# Patient Record
Sex: FEMALE | Race: WHITE
[De-identification: names, ages, dates, MRNs, and addresses within clinical notes are randomized per-mention and may not be internally consistent; named-entity substitution may affect disease eponyms.]

---

## 2017-05-12 NOTE — HISTORY & PHYSICAL EXAMINATION
DATE OF ADMISSION:  05/16/2017

 

PREOPERATIVE HISTORY AND PHYSICAL

 

HISTORY OF PRESENT ILLNESS:  A 56-year-old female presents for initial

evaluation of pain at the recommendation of Dr. Chase's, located in the

right heel, condition has existed for several months, described as burning,

sharp, stabbing, began several months ago.  She denies any precipitating

event or a recent exposure history for this condition.  This condition is

graded as a 6 on a 10 point scale, gradually worsened over time.  Associated

signs and symptoms include swelling over the area.  She notes Cam walker

improve the condition.  Oral medication did not help that much, physical

therapy increased her discomfort, rest somewhat improved her condition.  The

patient notes in December is when she notes the more severe pain had started

and underwent 7 weeks of therapy and then stopped as it being made worse. 

She then wore the Cam walker for 2 months, most of the time and she is

currently on Percocet for back discomfort which barely touches her foot pain.

 At nighttime, she notes she is unable to lay flat on her bed or move a

certain way, she is uncomfortable.  Dr. Chase in [place] has been

treating her conservatively for the past several months and recommended

surgical intervention, referred her to our office, due to failure of

conservative care.  She has been diabetic for over a year, has recently lost

some weight and she was able to stop her insulin that she had previously been

on.

 

PAST SURGICAL HISTORY:  Breast surgery, neck surgery, heart surgery, back

surgery, hysterectomy, and gallbladder removal.

2

PAST MEDICAL HISTORY:  Gastrointestinal problems, COPD, cardiac disease,

stroke, anxiety disorder, depression, arthritis and back problems.

 

MEDICATIONS:  Sominex, trazodone, Effexor, Percocet, gabapentin, and

verapamil.

 

ALLERGIES:  NONSTEROIDALS, CIPRO, AND PENICILLINS.

 

FAMILY HISTORY:  Anxiety, stress disorder, depression, back problems, birth

defects, cardiovascular disease, COPD, diabetes, gastrointestinal problems,

high cholesterol, hypertension, stroke, cancer, thyroid disease.

 

SOCIAL HISTORY:  The patient denies smoking, alcohol use, illicit drug use,

and STDs.

 

REVIEW OF SYSTEMS:  Unremarkable except chief complaint.

 

PHYSICAL EXAMINATION:

VITAL SIGNS:  Height 5 feet 7 inches, weight 151 pounds.  Body mass index 24.

CONSTITUTIONAL:  The patient appears well-developed and nourished with good

attention to body grooming and habitus.

LOWER EXTREMITY VASCULAR:  DP palpable.  PT palpable.  PT was 2/4

bilaterally, DP is 1/4 bilaterally.  Popliteal was 1/4 bilaterally.  Digital

hair is present.

DERMATOLOGIC:  Right Achilles tendon shows redness.  Inspection and palpation

of bursa shows soft, skin colored, soft elevated painful mass over the

Achilles tendon.

NEUROLOGICAL:  Touch, pin, vibratory pain, proprioception sensations are

normal.  Epicritic sensation per [place] monofilament 5.07 is

intact.

MUSCULOSKELETAL:  Muscle tone is normal.  Muscle strength is 5/5 all groups

tested.  Examination of posterior aspect of the heel reveals decreased ankle

joint, dorsiflexion, knee extended on the right, severe pain directly over

the Achilles attachment site, bony prominence posterior superior aspect of

the calcaneus and 2 focal areas of tenderness.  The heel shows an enlarged

posterior superior aspect of the calcaneus with pain to palpation directly

over the insertional area of the Achilles tendon.  Decreased joint range of

motion with knee flexed and extended.

 

IMPRESSION:

1.  Jovana deformity.

2.  Achilles tendinitis, left.

3.  Equinus, right.

4.  Achilles bursitis, right.

5.  Difficulty walking, right lower extremity pain, leg and foot, right.

 

PLAN:  I explained the mechanical etiology of discomfort.  Treatment

consisted of rest, ice, analgesics, nonsteroidal anti-inflammatory drugs,

night splints, physical therapy, steroid injections, stretching exercises,

orthotics, immobilization.  I explained the likely mechanical etiology of the

Jovana deformity is often called a pump bump, that can occur in 1 or both

feet, most commonly caused by the shape of the calcaneus, biomechanics,

hereditary and high arches and tight Achilles tendon, the prominent bump on

the back of the heel is a bony enlargement that often leads to painful

bursitis between the tendon and the bone due to the shoe irritation.  In the

cases of high arches, the calcaneus is tilted toward the Achilles tendon

causing the upper most superior portion of the calcaneus to rub against the

tendon.  Achilles tendonitis is often associated with the deformity due to

local irritation and thickening the tissues.  Treatment consists of rest,

ice, analgesics, nonsteroidals, anti-inflammatory drugs, heel lifts, heel

pad, shoe modification, night splints, physical therapy, steroid injections,

stretching exercises, orthotics, immobilization, avoid walking uphill.  The 
patient

was informed that nonsurgical treatment is targeted to reduce inflammation of

the soft tissue.  These conservative treatments will resolve the bursitis,

but will not likely shrink the bony projection.  Treatment usually begins

with nonsurgical measures and surgery is considerable in all other measures

have failed and pain is intolerable.

 

Surgical procedures to be performed:

1.  Jovana resection.

2.  Removal of exostosis posterior calcaneus with possible reattachment of

Achilles tendon.

3.  Achilles tendon lengthening.

4.  Achilles tendon lengthening, right lower extremity.

This will be performed under general anesthesia as an outpatient at the

hospital in the prone position.  Procedure, risks and complications were

fully reviewed with the patient.  Consent form and foot diagram and

illustration reviewed in all their entirety.  All the patient's questions

were answered.  Complications were discussed in detail with the patient

including pain, infection, swelling that may or may not be excessive, pins

and needles feeling, numbness, metatarsalgia, excessive bleeding, delay or

nonhealing of bone, delay or nonhealing of skin, enlarged scar, failure of

the procedure, reoccurrence or worsening of condition which may or may not

require further surgery, adverse reaction to anesthesia, suture

or other implant material, loss of toe, foot, or leg, flail toe, stiff toe,

short toe, elevated toe, transfer lesion or callus, peripheral neuritis.

Complications such as phlebitis, damage to nerves or vascular structures,

recurrent pain, chronic nerve pain or damage, and general medical

complications.  The patient will required to be nonweightbearing in a cast,

immobilization for a minimum of 6-8 weeks followed by weightbearing, cast,

immobilization for 2-4 weeks and not return to dress shoes for 3 months

depending on the postop edema.  The patient is aware this is an elective type

procedure and I recommend a second opinion.  The patient stated they

understood.  Consent form was signed with a copy of the foot diagram issued

to the patient.  Verbal and postoperative instructions were given.  The

patient will return to the office for a postoperative visit.

 

 

 

EZRA

## 2017-05-16 ENCOUNTER — HOSPITAL ENCOUNTER (OUTPATIENT)
Dept: HOSPITAL 45 - C.ACU | Age: 57
Discharge: HOME | End: 2017-05-16
Payer: COMMERCIAL

## 2017-05-16 VITALS
TEMPERATURE: 97.88 F | SYSTOLIC BLOOD PRESSURE: 130 MMHG | OXYGEN SATURATION: 94 % | HEART RATE: 67 BPM | DIASTOLIC BLOOD PRESSURE: 60 MMHG

## 2017-05-16 VITALS
DIASTOLIC BLOOD PRESSURE: 81 MMHG | OXYGEN SATURATION: 98 % | SYSTOLIC BLOOD PRESSURE: 135 MMHG | TEMPERATURE: 97.7 F | HEART RATE: 79 BPM

## 2017-05-16 VITALS
HEART RATE: 70 BPM | SYSTOLIC BLOOD PRESSURE: 133 MMHG | TEMPERATURE: 97.52 F | OXYGEN SATURATION: 94 % | DIASTOLIC BLOOD PRESSURE: 65 MMHG

## 2017-05-16 VITALS
BODY MASS INDEX: 24.22 KG/M2 | WEIGHT: 154.32 LBS | HEIGHT: 67.01 IN | BODY MASS INDEX: 24.22 KG/M2 | BODY MASS INDEX: 24.22 KG/M2 | WEIGHT: 154.32 LBS | HEIGHT: 67.01 IN

## 2017-05-16 DIAGNOSIS — M76.61: Primary | ICD-10-CM

## 2017-05-16 DIAGNOSIS — Z82.49: ICD-10-CM

## 2017-05-16 DIAGNOSIS — Z88.0: ICD-10-CM

## 2017-05-16 DIAGNOSIS — Z82.3: ICD-10-CM

## 2017-05-16 DIAGNOSIS — E11.9: ICD-10-CM

## 2017-05-16 DIAGNOSIS — Z79.899: ICD-10-CM

## 2017-05-16 DIAGNOSIS — Z83.3: ICD-10-CM

## 2017-05-16 DIAGNOSIS — Z86.73: ICD-10-CM

## 2017-05-16 DIAGNOSIS — Z88.1: ICD-10-CM

## 2017-05-16 DIAGNOSIS — Z98.890: ICD-10-CM

## 2017-05-16 DIAGNOSIS — F32.9: ICD-10-CM

## 2017-05-16 DIAGNOSIS — J44.9: ICD-10-CM

## 2017-05-16 DIAGNOSIS — M77.31: ICD-10-CM

## 2017-05-16 DIAGNOSIS — Z90.710: ICD-10-CM

## 2017-05-16 DIAGNOSIS — I10: ICD-10-CM

## 2017-05-16 DIAGNOSIS — Z81.8: ICD-10-CM

## 2017-05-16 NOTE — DIAGNOSTIC IMAGING REPORT
RIGHT HEEL MIN 2 VIEWS



CLINICAL HISTORY: LENGTHENING RIGHT LOWER EXT. REMOVAL OF EXOSTOSIS POSTERIOR

HEEL

Right    



COMPARISON: None.



DISCUSSION: The bones and joint spaces appear intact. There is no evidence of

fracture, dislocation or bony disease. There is no evidence for soft tissue

swelling. Postoperative changes to the posterior calcaneus



IMPRESSION: Image intensifier for intraoperative surgical evaluation purposes







Electronically signed by:  Maurilio Rodriguez M.D.

5/16/2017 8:37 AM



Dictated Date/Time:  5/16/2017 8:37 AM

## 2017-05-16 NOTE — DISCHARGE INSTRUCTIONS
Discharge Instructions


Date of Service


May 16, 2017.





Admission


Reason for Admission:  Jovana's Deformity





Discharge


Discharge Diagnosis / Problem:  same as diagnosis





Discharge Goals


Goal(s):  Decrease discomfort





Activity Recommendations


Activity Limitations:  as noted below





Medications:





*  Resume previous medications unless instructed by your surgeon.





*  Take your medications as prescribed.  Call our office ([phone]) at any


    time, if you experience severe pain that does not subside shortly after 

taking


    your pain medication.








Activity:





*  Do not put any standing weight on your operated foot/ankle.  Use


   the crutches or walker as instructed.








Special Care:





*  Keep your bandage clean and dry.  Do not remove your bandage 


    unless otherwise instructed.  A small amount of blood may appear on


    the bandage over the surgical site.  Call our office ([phone]) if you 


    bandage becomes blood-soaked or wet.





*  Elevate your operated foot/ankle on pillows, above the level of your


    heart, as often as possible during the first 2-3 days following surgery.


    Keep your knee flexed slightly with a pillow under your knee when you


    elevate your foot/ankle.





*  Apply a ice bag to your foot/ankle over the operative site for 20-30


    minutes out of each hour while you are awake.  Do not allow the ice bag


    to directly contact bare skin.





*  Avoid bumping or handling any pins visible in your toes.  If any pin


    feels or appears loose, call the office ([phone]).





* Take your oral temperature in the morning and at bedtime.  Call


   our office ([phone]) if your temperature rises above 101 degrees


   Fahrenheit.





Call your surgeon's office at ([phone]) for any problems or


concerns such as excessive bleeding and/or pain unrelieved by your


prescribed pain medications.





If you have any questions, please do not hesitate to ask them.








Avoid all tobacco products. If you need help to stop smoking, call


[place]'s FREE QUITLINE at 1-619.554.3474.  This is a free call.








Follow-up:





Follow-up with Dr. Urbano 





.





Current Hospital Diet


Patient's current hospital diet:





Discharge Diet


Recommended Diet:  Regular Diet





Pending Studies


Studies pending at discharge:  no





Medical Emergencies








.


Who to Call and When:





Medical Emergencies:  If at any time you feel your situation is an emergency, 

please call 911 immediately.





.





Non-Emergent Contact


Non-Emergency issues call your:  Primary Care Provider


.





"Provider Documentation" section prepared by Leola Naidu.








.





VTE Core Measure


Inpt VTE Proph given/why not?:  Treatment not indicated

## 2017-05-16 NOTE — DIAGNOSTIC IMAGING REPORT
RIGHT FOOT MIN 3 VIEWS ROUTINE



CLINICAL HISTORY: Postop right foot.   



COMPARISON STUDY:  Right heel 5/16/2017.



FINDINGS: Overlying splint material obscures fine bony detail. Postoperative

changes at the posterior calcaneus demonstrating partial resection. There is

soft tissue gas in the pre-Achilles space due to the postoperative change. No

fracture or dislocation.



IMPRESSION:  Postoperative changes within the posterior calcaneus. 









Electronically signed by:  Kvng Christina M.D.

5/16/2017 10:10 AM



Dictated Date/Time:  5/16/2017 10:08 AM

## 2017-05-16 NOTE — OPERATIVE REPORT
DATE OF OPERATION:  05/16/2017

 

SURGEON:  Leola Urbano DPM

 

PREOPERATIVE DIAGNOSES:  Achilles tendonitis, Jovana deformity, exostosis

posterior calcaneus.

 

POSTOPERATIVE DIAGNOSES:  Same.

 

PROCEDURES:

1.  Jovana's resection right.

2.  Exostectomy partial calcaneus, right.

3.  Repair and reattaching the Achilles tendon, right lower extremity.

4.  Tendo Achilles lengthening, right lower extremity.

 

ANESTHESIA:  Regional field block performed by anesthesia in preoperative

holding, general anesthesia.

 

HEMOSTASIS:  Pneumatic thigh tourniquet inflated to a level of 350 mmHg for a

total tourniquet time of 52 minutes.

 

MATERIALS:  Arthrex SpeedBridge with 4 BioComposite SwiveLock anchors

measuring 4.75 each, 2-0 and 3-0 Vicryl, and 4-0 nylon.

 

FINDINGS:  Enlarged retrocalcaneal spur more medial than lateral and enlarged

posterior superior aspect of the Jovana's on the calcaneus.

 

COMPLICATIONS:  None.

 

DESCRIPTION OF PROCEDURE:  The patient was brought to the OR and placed on

the OR table in supine position.  Upon completion of general anesthesia by

the anesthesia department, a well-padded thigh tourniquet was applied to the

right lower extremity.  The extremity was scrubbed, prepped and draped in the

usual aseptic fashion.  The patient was found prone on the table and elevated

and exsanguinated and tourniquet raised to a level of 350 mmHg.  Attention

was directed to the posterior aspect of the Achilles tendon where

approximately 10 cm incision was made just lateral to the Achilles tendon,

was taken down to peritenon through the equinus deformity that was present

and Achilles tendon lengthening was performed.  Distal posterior fibers and

10 cm the more anterior proximal fibers were split using a #11 blade and slid

on a final plain in a Z-type fashion allowing 10 degrees of dorsiflexion.  At

this time, the posterior superior aspect of the calcaneus was resected with

partial removal of the calcaneus over this area.  The tendon attachment was

slightly reflected; however, the large gryphotic spur was quite encompassing

and over 90% of the fibers were released.  Removal of the exostosis was

occurred.  There was small portion of fibers still remaining, although the

majority of the tendon was released.  The tendon was repaired and reattached

to the posterior aspect of the calcaneus using Arthrex SpeedBridge with 4

SwiveLock 4.75 anchors.  Wound was copiously lavaged with normal saline. 

Closure began in the deep structures using 2-0 Vicryl.  Superficial deep

structures closed using 3-0 Vicryl.  Skin margins closed using 4-0 nylon. 

Pneumatic ankle tourniquet was released and a dry sterile compressive

dressing consisting of Adaptic soaked, 4 x 4's, Josh, Webril and a

well-padded posterior splint was applied.  The patient was transported to

recovery room with vital signs stable and neurovascular status intact.

 

 

I attest to the content of the Intraoperative Record and any orders documented therein. Any exceptio
ns are noted below.

## 2017-06-13 LAB
ANION GAP SERPL CALC-SCNC: 6 MMOL/L (ref 3–11)
APPEARANCE UR: CLEAR
BASOPHILS # BLD: 0.04 K/UL (ref 0–0.2)
BASOPHILS NFR BLD: 0.7 %
BILIRUB UR-MCNC: (no result) MG/DL
BUN SERPL-MCNC: 11 MG/DL (ref 7–18)
BUN/CREAT SERPL: 12.6 (ref 10–20)
CALCIUM SERPL-MCNC: 8.4 MG/DL (ref 8.5–10.1)
CHLORIDE SERPL-SCNC: 107 MMOL/L (ref 98–107)
CO2 SERPL-SCNC: 29 MMOL/L (ref 21–32)
COLOR UR: YELLOW
COMPLETE: YES
CREAT CL PREDICTED SERPL C-G-VRATE: 72.4 ML/MIN
CREAT SERPL-MCNC: 0.86 MG/DL (ref 0.6–1.2)
EOSINOPHIL NFR BLD AUTO: 253 K/UL (ref 130–400)
GLUCOSE SERPL-MCNC: 108 MG/DL (ref 70–99)
HCT VFR BLD CALC: 37.2 % (ref 37–47)
IG%: 0.2 %
IMM GRANULOCYTES NFR BLD AUTO: 26.6 %
LYMPHOCYTES # BLD: 1.58 K/UL (ref 1.2–3.4)
MANUAL MICROSCOPIC REQUIRED?: NO
MCH RBC QN AUTO: 28.2 PG (ref 25–34)
MCHC RBC AUTO-ENTMCNC: 30.9 G/DL (ref 32–36)
MCV RBC AUTO: 91.2 FL (ref 80–100)
MONOCYTES NFR BLD: 7.4 %
NEUTROPHILS # BLD AUTO: 3.7 %
NEUTROPHILS NFR BLD AUTO: 61.4 %
NITRITE UR QL STRIP: (no result)
PH UR STRIP: 5.5 [PH] (ref 4.5–7.5)
PMV BLD AUTO: 10 FL (ref 7.4–10.4)
POTASSIUM SERPL-SCNC: 3.8 MMOL/L (ref 3.5–5.1)
RBC # BLD AUTO: 4.08 M/UL (ref 4.2–5.4)
REVIEW REQ?: NO
SODIUM SERPL-SCNC: 142 MMOL/L (ref 136–145)
SP GR UR STRIP: 1.02 (ref 1–1.03)
URINE BILL WITH OR WITHOUT MIC: (no result)
URINE EPITHELIAL CELL AUTO: (no result) /LPF (ref 0–5)
UROBILINOGEN UR-MCNC: (no result) MG/DL
WBC # BLD AUTO: 5.94 K/UL (ref 4.8–10.8)

## 2017-06-13 NOTE — PAT MEDICATION INSTRUCTIONS
Service Date


Jun 13, 2017.





Current Home Medication List


Carisoprodol (Soma), 350 MG PO TID PRN for Pain


Diphenhydramine Hcl (Sleep) (Sominex), 50 MG PO HS


Gabapentin (Neurontin), 800 MG PO TID PRN for RN


Topiramate (Topamax), 50 MG PO BID


Trazodone Hcl (Trazodone), 150 MG PO HS


Venlafaxine Hcl (Effexor Extended Rel), 150 MG PO QAM


Verapamil (Calan), 120 MG PO HS





Medication Instructions


For Your Scheduled Surgery 








- Hold the following medications the morning of surgery:


Carisoprodol (Soma), 350 MG PO TID PRN for Pain








- Take the following medications the morning of surgery with a sip of water:


Venlafaxine Hcl (Effexor Extended Rel), 150 MG PO QAM


Topiramate (Topamax), 50 MG PO BID


Gabapentin (Neurontin), 800 MG PO TID PRN for RN (if needed)


Percocet 10/325mg (okay to take up to 4 hours prior to surgery if needed)








- Take the following medications as scheduled the night before surgery:


Verapamil (Calan), 120 MG PO HS


Topiramate (Topamax), 50 MG PO BID


Trazodone Hcl (Trazodone), 150 MG PO HS


Gabapentin (Neurontin), 800 MG PO TID PRN for RN  (if needed)


Diphenhydramine Hcl (Sleep) (Sominex), 50 MG PO HS 


Carisoprodol (Soma), 350 MG PO TID PRN for Pain  (if needed)


Percocet 10/325mg (if needed)





If you have any questions please call us at [phone] or [phone] or 

[phone]

## 2017-07-05 ENCOUNTER — HOSPITAL ENCOUNTER (INPATIENT)
Dept: HOSPITAL 45 - C.ACU | Age: 57
LOS: 1 days | Discharge: HOME | DRG: 473 | End: 2017-07-06
Attending: ORTHOPAEDIC SURGERY | Admitting: ORTHOPAEDIC SURGERY
Payer: COMMERCIAL

## 2017-07-05 VITALS
TEMPERATURE: 98.24 F | OXYGEN SATURATION: 95 % | SYSTOLIC BLOOD PRESSURE: 156 MMHG | HEART RATE: 94 BPM | DIASTOLIC BLOOD PRESSURE: 72 MMHG

## 2017-07-05 VITALS
HEART RATE: 102 BPM | TEMPERATURE: 97.88 F | SYSTOLIC BLOOD PRESSURE: 149 MMHG | OXYGEN SATURATION: 95 % | DIASTOLIC BLOOD PRESSURE: 75 MMHG

## 2017-07-05 VITALS
DIASTOLIC BLOOD PRESSURE: 71 MMHG | OXYGEN SATURATION: 96 % | SYSTOLIC BLOOD PRESSURE: 127 MMHG | HEART RATE: 104 BPM | TEMPERATURE: 98.24 F

## 2017-07-05 VITALS
HEART RATE: 93 BPM | OXYGEN SATURATION: 98 % | TEMPERATURE: 97.88 F | SYSTOLIC BLOOD PRESSURE: 91 MMHG | DIASTOLIC BLOOD PRESSURE: 55 MMHG

## 2017-07-05 VITALS — OXYGEN SATURATION: 99 % | HEART RATE: 91 BPM

## 2017-07-05 VITALS
HEART RATE: 98 BPM | DIASTOLIC BLOOD PRESSURE: 71 MMHG | OXYGEN SATURATION: 95 % | SYSTOLIC BLOOD PRESSURE: 131 MMHG | TEMPERATURE: 98.06 F

## 2017-07-05 VITALS
OXYGEN SATURATION: 95 % | HEART RATE: 84 BPM | SYSTOLIC BLOOD PRESSURE: 121 MMHG | DIASTOLIC BLOOD PRESSURE: 69 MMHG | TEMPERATURE: 97.88 F

## 2017-07-05 VITALS
WEIGHT: 162.7 LBS | BODY MASS INDEX: 24.66 KG/M2 | HEIGHT: 68 IN | BODY MASS INDEX: 24.66 KG/M2 | WEIGHT: 162.7 LBS | HEIGHT: 68 IN | BODY MASS INDEX: 24.66 KG/M2

## 2017-07-05 VITALS — HEART RATE: 99 BPM | OXYGEN SATURATION: 96 %

## 2017-07-05 VITALS
DIASTOLIC BLOOD PRESSURE: 66 MMHG | TEMPERATURE: 98.6 F | OXYGEN SATURATION: 94 % | HEART RATE: 70 BPM | SYSTOLIC BLOOD PRESSURE: 135 MMHG

## 2017-07-05 VITALS — HEART RATE: 82 BPM | OXYGEN SATURATION: 94 %

## 2017-07-05 VITALS
HEART RATE: 86 BPM | SYSTOLIC BLOOD PRESSURE: 96 MMHG | OXYGEN SATURATION: 95 % | TEMPERATURE: 98.24 F | DIASTOLIC BLOOD PRESSURE: 50 MMHG

## 2017-07-05 VITALS
SYSTOLIC BLOOD PRESSURE: 108 MMHG | DIASTOLIC BLOOD PRESSURE: 49 MMHG | TEMPERATURE: 98.42 F | OXYGEN SATURATION: 98 % | HEART RATE: 69 BPM

## 2017-07-05 VITALS — OXYGEN SATURATION: 95 %

## 2017-07-05 DIAGNOSIS — M48.02: Primary | ICD-10-CM

## 2017-07-05 DIAGNOSIS — Z88.6: ICD-10-CM

## 2017-07-05 DIAGNOSIS — Z88.0: ICD-10-CM

## 2017-07-05 DIAGNOSIS — Z88.8: ICD-10-CM

## 2017-07-05 DIAGNOSIS — Z98.1: ICD-10-CM

## 2017-07-05 DIAGNOSIS — Z88.1: ICD-10-CM

## 2017-07-05 DIAGNOSIS — Z79.899: ICD-10-CM

## 2017-07-05 PROCEDURE — 0RP10AZ REMOVAL OF INTERBODY FUSION DEVICE FROM CERVICAL VERTEBRAL JOINT, OPEN APPROACH: ICD-10-PCS | Performed by: ORTHOPAEDIC SURGERY

## 2017-07-05 PROCEDURE — 0RG10Z0: ICD-10-PCS | Performed by: ORTHOPAEDIC SURGERY

## 2017-07-05 PROCEDURE — 0RG40Z0: ICD-10-PCS | Performed by: ORTHOPAEDIC SURGERY

## 2017-07-05 PROCEDURE — 0RT30ZZ RESECTION OF CERVICAL VERTEBRAL DISC, OPEN APPROACH: ICD-10-PCS | Performed by: ORTHOPAEDIC SURGERY

## 2017-07-05 RX ADMIN — FENTANYL CITRATE PRN MCG: 50 INJECTION, SOLUTION INTRAMUSCULAR; INTRAVENOUS at 10:17

## 2017-07-05 RX ADMIN — SODIUM CHLORIDE SCH MLS/HR: 900 INJECTION, SOLUTION INTRAVENOUS at 14:28

## 2017-07-05 RX ADMIN — CLINDAMYCIN PHOSPHATE SCH MLS/HR: 150 INJECTION, SOLUTION INTRAMUSCULAR; INTRAVENOUS at 23:40

## 2017-07-05 RX ADMIN — OXYCODONE HYDROCHLORIDE PRN MG: 5 TABLET ORAL at 14:32

## 2017-07-05 RX ADMIN — FENTANYL CITRATE PRN MCG: 50 INJECTION, SOLUTION INTRAMUSCULAR; INTRAVENOUS at 10:11

## 2017-07-05 RX ADMIN — FENTANYL CITRATE PRN MCG: 50 INJECTION, SOLUTION INTRAMUSCULAR; INTRAVENOUS at 10:06

## 2017-07-05 RX ADMIN — DEXAMETHASONE SODIUM PHOSPHATE SCH MLS/MIN: 4 INJECTION, SOLUTION INTRA-ARTICULAR; INTRALESIONAL; INTRAMUSCULAR; INTRAVENOUS; SOFT TISSUE at 16:48

## 2017-07-05 RX ADMIN — DOCUSATE SODIUM SCH MG: 100 CAPSULE, LIQUID FILLED ORAL at 21:49

## 2017-07-05 RX ADMIN — SODIUM CHLORIDE SCH MLS/HR: 900 INJECTION, SOLUTION INTRAVENOUS at 23:41

## 2017-07-05 RX ADMIN — TOPIRAMATE SCH MG: 25 TABLET, FILM COATED ORAL at 21:50

## 2017-07-05 RX ADMIN — CLINDAMYCIN PHOSPHATE SCH MLS/HR: 150 INJECTION, SOLUTION INTRAMUSCULAR; INTRAVENOUS at 16:48

## 2017-07-05 RX ADMIN — FENTANYL CITRATE PRN MCG: 50 INJECTION, SOLUTION INTRAMUSCULAR; INTRAVENOUS at 10:22

## 2017-07-05 RX ADMIN — FENTANYL CITRATE PRN MCG: 50 INJECTION, SOLUTION INTRAMUSCULAR; INTRAVENOUS at 09:56

## 2017-07-05 RX ADMIN — OXYCODONE HYDROCHLORIDE PRN MG: 5 TABLET ORAL at 22:20

## 2017-07-05 RX ADMIN — FENTANYL CITRATE PRN MCG: 50 INJECTION, SOLUTION INTRAMUSCULAR; INTRAVENOUS at 10:01

## 2017-07-05 RX ADMIN — DEXAMETHASONE SODIUM PHOSPHATE SCH MLS/MIN: 4 INJECTION, SOLUTION INTRA-ARTICULAR; INTRALESIONAL; INTRAMUSCULAR; INTRAVENOUS; SOFT TISSUE at 23:41

## 2017-07-05 NOTE — DIAGNOSTIC IMAGING REPORT
CERVICAL 2 OR 3 VIEWS



CLINICAL HISTORY: 56 year-old Female presenting with ACDF C4-C6/possible C7-T1. 



TECHNIQUE: 3 fluoroscopic spot views of the cervical spine were obtained

including frontal and lateral views.



COMPARISON:  1/24/2011.



FINDINGS/IMPRESSION:

Interbody spacers at C4-5 and C5-6 with anterior cervical plate and screw

fixation of C6-T1. This is unchanged in appearance from prior exam. No gross

evidence of prevertebral soft tissue thickening.







Electronically signed by:  Jus Hough

7/5/2017 9:37 AM



Dictated Date/Time:  7/5/2017 9:33 AM

## 2017-07-05 NOTE — [facility] POST OPERATIVE BRIEF NOTE
Immediate Operative Summary


Operative Date


Jul 5, 2017.





Pre-Operative Diagnosis





Cervical stenosis





Post-Operative Diagnosis





Cervical Stenosis





Procedure(s) Performed





acdf C6-7 C7-T1





Surgeon


Dr. David Hewitt





Assistant Surgeon(s)


Mariely Pendleton PA-C





Estimated Blood Loss


30





Findings


stenosis





Specimens





A: Explanted hardware cervical spine C4-C6

## 2017-07-05 NOTE — DISCHARGE INSTRUCTIONS
Discharge Instructions


Date of Service


Jul 5, 2017.





Admission


Reason for Admission:  Cervical Spinal Stenosis





Discharge


Discharge Diagnosis / Problem:  cervical stenosis





Discharge Goals


Goal(s):  Improve function





Activity Recommendations


Activity Limitations:  per Instructions/Follow-up section





.





Instructions / Follow-Up


Instructions / Follow-Up





ACTIVITY RECOMMENDATIONS:








SELF CARE INSTRUCTIONS AFTER CERVICAL FUSIONS





1.  No smoking.  Smoking drastically decreases the chance of a solid fusion.





2.  No bending, lifting more than 5 pounds, or twisting (roll like a log when


     turning in bed).





3.  You may shower 3 days after surgery.  Thoroughly dry wound.  Do not


     soak in the tub.





4.  Cervical collar:  Must be worn at all times including sleeping.  You may


     remove the brace only to bath, eat and if you are sitting in a recliner.





5.  Please walk as much as you can for exercise.  Gradually increase the 


     distance that you walk as your endurance increases.








SPECIAL CARE INSTRUCTIONS:





**VERY IMPORTANT TO READ AND REVIEW**





A.  Do not take any anti-inflammatory medications (i.e. Indocin, Advil, Aspirin,


     Naprosyn, Aleve, Motrin, etc.) as these may inhibit the chance of a solid 


     fusion.  Tylenol is okay to take.





B.  Your surgical incision has been closed with a cosmetic suture under the skin


     that will dissolve in about 6 weeks.  In 14 days, you can use a pair of 

clean


     scissors and cut the suture that is left outside of the skin at the ends 

of 


     your incision.





C.  Complications are uncommon, but please contact us if you have any signs or 


     symptoms of:


   1.  wound infection (fever higher than 102.5 degrees F, redness, separation


              of wound, drainage, or increasing pain from the incision) 


   2.  blood clots in legs (pain, swelling, redness and warmth in legs)


   3.  urinary tract infection (fever higher than 102.5 degrees, burning upon 


              urination or increased frequency of urination)


   4.  nerve problems (inability to walk on your toes or heels, numbness, loss 


              of bowel or bladder control)


   5.  any other symptoms that concern you.





D.  Please call the office at [phone] if you have any concerns or 

questions 


     about your operation or recovery.








MANAGING PAIN AFTER SPINAL SURGERY





1.  Narcotic medication is intended for short-term use and will be provided for 


     surgical pain.  Surgical pain usually lasts for a period of 4-6 weeks.  

Narcotic


     medication includes Percocet, Vicodin, Darvocet, Tylenol #3 or Lortab.





2.  Longer-term pain is more appropriately treated with non-narcotic medication


    such as Tylenol ES.





3.  Muscle spasm is not appropriately treated with narcotics.  Muscle relaxers 

such


    as Soma, Flexeril or Skelaxin can be used along with Tylenol ES.





4.  Remember that we all live with some "aches and pains".  This is not unusual 

or 


     uncommon after an injury or as we get older.





5.  We will provide appropriate medication within the normal guidelines of 

their 


     prescribed use.  We will also be very cautious and aware of potential abuse


     and extended duration of patients' medication needs.





6.  Please allow 2-3 days to process refills.  Prescriptions will not be mailed 

but 


    must be picked up at the office.








FOLLOW UP VISIT:





Keep your scheduled follow-up appointment. 





Any questions, please call the office at [phone].





Current Hospital Diet


Patient's current hospital diet: Clear Liquid Diet





Discharge Diet


Recommended Diet:  Regular Diet





Procedures


Procedures Performed:  


C6-C7, C7-T1 Anterior Cervical Discectomy and Fusion; Removal of Intervertebral 


Disc / Decompression,  Allograft; Anterior Plate and Screw Fixation; C4-C5, 


C5-C6 Hardware Removal





Pending Studies


Studies pending at discharge:  no





Medical Emergencies








.


Who to Call and When:





Medical Emergencies:  If at any time you feel your situation is an emergency, 

please call 911 immediately.





.





Non-Emergent Contact


Non-Emergency issues call your:  Primary Care Provider


.








"Provider Documentation" section prepared by David Hewitt.








.





VTE Core Measure


Inpt VTE Proph given/why not?:  T.E.D. Stockings, SCD's

## 2017-07-05 NOTE — ANESTHESIOLOGY PROGRESS NOTE
Anesthesia Post Op Note


Date & Time


Jul 5, 2017 at 10:40





Vital Signs


Pain Intensity:  9





Vital Signs Past 12 Hours








  Date Time  Temp Pulse Resp B/P (MAP) Pulse Ox O2 Delivery O2 Flow Rate FiO2


 


7/5/17 10:30  81 18 109/62 97 Nasal Cannula 4 


 


7/5/17 10:20  86 14 118/64 96 Nasal Cannula 4 


 


7/5/17 10:10  86 14 129/69 99 Nasal Cannula 4 


 


7/5/17 10:00  91 14 158/101 99 Nasal Cannula 4 


 


7/5/17 09:50  79 14 151/80 99 Mask 10 


 


7/5/17 09:40  79 14 147/72 98 Mask 10 


 


7/5/17 09:34 36.4 82 14 158/99 97 Mask 10 


 


7/5/17 05:47 36.9 69 18 108/49 98 Room Air  











Notes


Mental Status:  alert / awake / arousable, participated in evaluation


Pt Amnestic to Procedure:  Yes


Nausea / Vomiting:  adequately controlled


Pain:  adequately controlled


Airway Patency, RR, SpO2:  stable & adequate


BP & HR:  stable & adequate


Hydration State:  stable & adequate


Anesthetic Complications:  no major complications apparent

## 2017-07-05 NOTE — HISTORY AND PHYSICAL
History & Physical


Date


Jul 5, 2017.





Chief Complaint


neck and arm pain





History of Present Illness


The patient is a 56 year old female with complaints of





Additional History


Hepatic Disease:  No


Endocrine Disorder:  No


Kidney Disease:  No


Hypertension:  No


Heart Disease:  No


Bleeding Tendencies:  No


Infectious Diseases:  No





Allergies


Coded Allergies:  


     Ciprofloxacin (Verified  Allergy, Unknown, ITCH AND RASH, 7/5/17)


     Metformin (Verified  Allergy, Unknown, NAUSEA VOMITNG DIARRHEA, 7/5/17)


     Varenicline (Verified  Allergy, Unknown, BAD NIGHTMARES, 7/5/17)


     NSAIDs (Verified  Adverse Reaction, Intermediate, N/V  AND DIARRHEA, 7/5/17

)


     Penicillins (Verified  Adverse Reaction, Mild, N/V, 7/5/17)





Home Medications


Scheduled


Diphenhydramine Hcl (Sleep) (Sominex), 50 MG PO HS


Topiramate (Topamax), 50 MG PO BID


Trazodone Hcl (Trazodone), 150 MG PO HS


Venlafaxine Hcl (Effexor Extended Rel), 150 MG PO QAM


Verapamil (Calan), 120 MG PO HS


[Somapur], 1 TAB PO HS





Scheduled PRN


Carisoprodol (Soma), 350 MG PO TID PRN for Pain


Gabapentin (Neurontin), 800 MG PO TID PRN for Pain


Oxycodone/Acetaminophen 10MG/325MG (Percocet 10MG/325MG), 1 TAB PO Q4H PRN for 

Pain





Physical Examination


Skin:  warm/dry, no rash


Eyes:  normal inspection, EOMI, sclerae normal


ENT:  normal ENT inspection, pharynx normal


Head:  normocephalic, atraumatic


Neck:  supple, no adenopathy, trachea midline


Respiratory/Chest:  lungs clear, normal breath sounds, no respiratory distress


Cardiovascular:  regular rate, rhythm, no edema, no murmur


Abdomen / GI:  normal bowel sounds, non tender


Back:  normal inspection


Extremities:  normal inspection, normal range of motion


Neurologic/Psych:  no motor/sensory deficits, alert, normal reflexes, oriented 

x 3





Diagnosis


cervical stenosis





Plan of Treatment


removal instrumentation C4-C6, ACDF C6-7 possible C7-T1

## 2017-07-05 NOTE — [facility] OPERATIVE REPORT
Operative Report


Operative Date


Jul 5, 2017.





Pre-Operative Diagnosis





Cervical stenosis





Post-Operative Diagnosis


same





Procedure(s) Performed


#1 removal of instrumentation C4 5 and C5 6 


#2 expiration of fusion C4 5 and C5 6


#3 anterior cervical discectomy and bilateral foraminotomies C6 7 C7-T1 


# 4 anterior cervical arthrodesis C6 7 and C7-T1 #5 placement of allograft 

filled with DBM C6 7 C7-T1


#6 application of kim plate and screws C6 to T1.





Surgeon


Dr. David Hewitt





Assistant Surgeon(s)


Mariely Pendleton PA-C





Estimated Blood Loss


30ml





Findings


Spinal stenosis





Specimens





A: Explanted hardware cervical spine C4-C6





Description of Procedure


Patient was met with preoperative case discussed PROGRESS.  The patient was 

taken back to the operative suite and after undergoing successful inhibition 

via the department of anesthesia was placed in supine position adjusted with 

head in Petty head chang.  All bony promises well-padded eyes were 

inspected to ensure there is no external pressure placed upon them.  At this 

time the interstitial was once prepped and draped in the normal sterile 

fashion.  Longitudinal incision was placed on the right anterior aspect of 

cervical spine.  Sharp dissection assistance of bipolar cautery was performed 

down to and exposing the anterior cervical spine from C4 to T1.  We identified 

the previous plate was removed without difficulty.  I explored the fusion at 

the C4 5 and C5 6 levels.  It was intact.  Then performed a complete discectomy 

of C6 7 out to the uncovertebral joints bilaterally.  [place] distraction pins 

were utilized to assist us no visualization.  I did remove all posterior 

annular fibers and longitudinal ligament performing bilateral foraminotomies.  

Endplates were then burred to subcortical bleeding bone and a 7 mm cortical R 

graft filled with DBM tapped in position.  The distraction apparatus was 

removed proceeded to the C7-T1 level.  Again a complete discectomy performed 

out to the uncovertebral joints bilaterally [place] distracting pins again 

utilized to assist us no visualization.  I did remove all posterior annular 

fibers perform bilateral foraminotomies.  End plates were then burred to 

subcortical bleeding bone 8 mm cortical allograft filled with DBM tapped in 

which position.  Distraction apparatus was removed.  All anterior osteophytes 

burred to a smooth cortical surface.  A far complete screws identified with the 

assistance of fluoroscopy.  Incision was in copious irrigated explored to 

ensure there is no damage to signing structures remaining bleeding.  10 round 

SHELDON drain inserted.  Incision then closed with 2 Vicryl simultaneous the 4-0 

Monocryl for final skin closure.  Steri-Strips sterile dressing placed patient 

taken to PACU in stable condition.


I attest to the content of the Intraoperative Record and any orders documented 

therein.  Any exceptions are noted below.

## 2017-07-06 VITALS
SYSTOLIC BLOOD PRESSURE: 120 MMHG | HEART RATE: 90 BPM | TEMPERATURE: 98.42 F | DIASTOLIC BLOOD PRESSURE: 67 MMHG | OXYGEN SATURATION: 95 %

## 2017-07-06 VITALS
OXYGEN SATURATION: 95 % | TEMPERATURE: 98.24 F | SYSTOLIC BLOOD PRESSURE: 133 MMHG | HEART RATE: 84 BPM | DIASTOLIC BLOOD PRESSURE: 76 MMHG

## 2017-07-06 VITALS
SYSTOLIC BLOOD PRESSURE: 163 MMHG | OXYGEN SATURATION: 95 % | HEART RATE: 71 BPM | DIASTOLIC BLOOD PRESSURE: 76 MMHG | TEMPERATURE: 98.24 F

## 2017-07-06 VITALS — OXYGEN SATURATION: 95 % | HEART RATE: 84 BPM

## 2017-07-06 VITALS
HEART RATE: 80 BPM | SYSTOLIC BLOOD PRESSURE: 137 MMHG | DIASTOLIC BLOOD PRESSURE: 72 MMHG | OXYGEN SATURATION: 97 % | TEMPERATURE: 97.88 F

## 2017-07-06 VITALS — HEART RATE: 87 BPM | OXYGEN SATURATION: 97 %

## 2017-07-06 VITALS
DIASTOLIC BLOOD PRESSURE: 68 MMHG | HEART RATE: 71 BPM | SYSTOLIC BLOOD PRESSURE: 129 MMHG | TEMPERATURE: 98.24 F | OXYGEN SATURATION: 94 %

## 2017-07-06 VITALS — OXYGEN SATURATION: 96 %

## 2017-07-06 VITALS
OXYGEN SATURATION: 96 % | SYSTOLIC BLOOD PRESSURE: 146 MMHG | TEMPERATURE: 98.6 F | HEART RATE: 80 BPM | DIASTOLIC BLOOD PRESSURE: 68 MMHG

## 2017-07-06 RX ADMIN — DOCUSATE SODIUM SCH MG: 100 CAPSULE, LIQUID FILLED ORAL at 09:24

## 2017-07-06 RX ADMIN — TOPIRAMATE SCH MG: 25 TABLET, FILM COATED ORAL at 09:24

## 2017-07-06 RX ADMIN — CLINDAMYCIN PHOSPHATE SCH MLS/HR: 150 INJECTION, SOLUTION INTRAMUSCULAR; INTRAVENOUS at 08:20

## 2017-07-06 RX ADMIN — OXYCODONE HYDROCHLORIDE PRN MG: 5 TABLET ORAL at 05:08

## 2017-07-06 RX ADMIN — DEXAMETHASONE SODIUM PHOSPHATE SCH MLS/MIN: 4 INJECTION, SOLUTION INTRA-ARTICULAR; INTRALESIONAL; INTRAMUSCULAR; INTRAVENOUS; SOFT TISSUE at 08:19

## 2017-07-06 NOTE — DISCHARGE SUMMARY
Orthopedic Discharge Summary


Admission Date/Reason


Jul 5, 2017 at 07:00


Cervical Spinal Stenosis.





Discharge Date/Disposition


Jul 6, 2017


Home





Diagnosis


Principal Diagnosis:


Cervical stenosis





Admission Physical Exam


As per Admitting History & Physical.





Hospital Course


Patient underwent anterior cervical discectomy and fusion.  Postoperative leash 

was swallowing without difficulty no hoarseness.  Arm symptoms were poorly 

improved.  SHELDON drain decreased appropriately.  Subsequently discharged home.  

Discharge orders and instructions found on the chart for further review.





Discharge Instructions


Please refer to the electronic Patient Visit Report (Discharge Instructions) 

for additional information.

## 2018-04-10 ENCOUNTER — HOSPITAL ENCOUNTER (EMERGENCY)
Dept: HOSPITAL 45 - C.EDB | Age: 58
Discharge: HOME | End: 2018-04-10
Payer: COMMERCIAL

## 2018-04-10 VITALS — SYSTOLIC BLOOD PRESSURE: 123 MMHG | HEART RATE: 93 BPM | OXYGEN SATURATION: 99 % | DIASTOLIC BLOOD PRESSURE: 67 MMHG

## 2018-04-10 VITALS — TEMPERATURE: 98.06 F

## 2018-04-10 DIAGNOSIS — M25.512: ICD-10-CM

## 2018-04-10 DIAGNOSIS — Z88.8: ICD-10-CM

## 2018-04-10 DIAGNOSIS — Z88.0: ICD-10-CM

## 2018-04-10 DIAGNOSIS — Z83.3: ICD-10-CM

## 2018-04-10 DIAGNOSIS — R51: Primary | ICD-10-CM

## 2018-04-10 DIAGNOSIS — M54.2: ICD-10-CM

## 2018-04-10 DIAGNOSIS — Z88.1: ICD-10-CM

## 2018-04-10 DIAGNOSIS — Z79.899: ICD-10-CM

## 2018-04-10 DIAGNOSIS — Z82.49: ICD-10-CM

## 2018-04-10 LAB
ALBUMIN SERPL-MCNC: 3.6 GM/DL (ref 3.4–5)
ALP SERPL-CCNC: 119 U/L (ref 45–117)
ALT SERPL-CCNC: 18 U/L (ref 12–78)
AST SERPL-CCNC: 20 U/L (ref 15–37)
BASOPHILS # BLD: 0.03 K/UL (ref 0–0.2)
BASOPHILS NFR BLD: 0.5 %
BUN SERPL-MCNC: 9 MG/DL (ref 7–18)
CALCIUM SERPL-MCNC: 8.6 MG/DL (ref 8.5–10.1)
CO2 SERPL-SCNC: 29 MMOL/L (ref 21–32)
CREAT SERPL-MCNC: 0.84 MG/DL (ref 0.6–1.2)
EOS ABS #: 0.11 K/UL (ref 0–0.5)
EOSINOPHIL NFR BLD AUTO: 256 K/UL (ref 130–400)
GLUCOSE SERPL-MCNC: 94 MG/DL (ref 70–99)
HCT VFR BLD CALC: 36.7 % (ref 37–47)
HGB BLD-MCNC: 11.7 G/DL (ref 12–16)
IG#: 0.01 K/UL (ref 0–0.02)
IMM GRANULOCYTES NFR BLD AUTO: 35.8 %
LYMPHOCYTES # BLD: 2.21 K/UL (ref 1.2–3.4)
MCH RBC QN AUTO: 28.5 PG (ref 25–34)
MCHC RBC AUTO-ENTMCNC: 31.9 G/DL (ref 32–36)
MCV RBC AUTO: 89.5 FL (ref 80–100)
MONO ABS #: 0.53 K/UL (ref 0.11–0.59)
MONOCYTES NFR BLD: 8.6 %
NEUT ABS #: 3.28 K/UL (ref 1.4–6.5)
NEUTROPHILS # BLD AUTO: 1.8 %
NEUTROPHILS NFR BLD AUTO: 53.1 %
PMV BLD AUTO: 9.4 FL (ref 7.4–10.4)
POTASSIUM SERPL-SCNC: 3.9 MMOL/L (ref 3.5–5.1)
PROT SERPL-MCNC: 7.5 GM/DL (ref 6.4–8.2)
RED CELL DISTRIBUTION WIDTH CV: 13.3 % (ref 11.5–14.5)
RED CELL DISTRIBUTION WIDTH SD: 43.4 FL (ref 36.4–46.3)
SODIUM SERPL-SCNC: 140 MMOL/L (ref 136–145)
WBC # BLD AUTO: 6.17 K/UL (ref 4.8–10.8)

## 2018-04-10 NOTE — DIAGNOSTIC IMAGING REPORT
CHEST ONE VIEW PORTABLE



CLINICAL HISTORY: EVALUATE ALTERED MENTAL STATUS/WEAKNESS    



COMPARISON STUDY:  Chest radiograph January 12, 2011.



FINDINGS: Anterior spine fusion is noted. There is no pneumothorax or pleural

effusion. There is no consolidation. Linear left basilar opacity suggestive

atelectasis. There is pulmonary vascular congestion without overt pulmonary

edema. 



IMPRESSION:  Pulmonary vascular congestion. 









Electronically signed by:  Rubens Madison M.D.

4/10/2018 12:50 PM



Dictated Date/Time:  4/10/2018 12:47 PM

## 2018-04-10 NOTE — DIAGNOSTIC IMAGING REPORT
HEAD CT NONCONTRAST



CT DOSE: 669.45 mGycm



HISTORY: Frequent falls.  EVALUATE ALTERED MENTAL STATUS/WEAKNESS



TECHNIQUE: Multiaxial CT images of the head were performed without the use of

intravenous contrast. Automated exposure control was utilized for this study.  A

dose lowering technique was utilized adhering to the principles of ALARA.



Comparison: None.



Findings: The paranasal sinuses and mastoid air cells are clear. No acute

calvarial fractures. Postoperative changes consistent with a left temporal

craniectomy with overlying metallic plate. There is no aneurysm clip at the

basilar cisterns. Small focus of encephalomalacia within the left frontal

periventricular white matter consistent with an old infarct. There is no mass,

hematoma, midline shift, acute infarct.



Impression:

Postoperative changes as described above. No acute intracranial abnormality. 







Electronically signed by:  Kvng Christina M.D.

4/10/2018 1:18 PM



Dictated Date/Time:  4/10/2018 1:10 PM

## 2020-03-03 NOTE — EMERGENCY ROOM VISIT NOTE
History


Report prepared by Neto:  Nita Villalobos


Under the Supervision of:  Dr. Juan Mcgill D.O.


First contact with patient:  12:21


Chief Complaint:  FALL


Stated Complaint:  FREQUENT FALLS





History of Present Illness


The patient is a 57 year old female who presents to the Emergency Room with 

complaints of worsening frequent falls starting 3-4 days ago. The patient spoke 

to her PCP and was sent to the ED for her frequent falls. She has been falling 

without realizing she is falling. She has fallen to the ground while resting on 

the sofa. She has been getting dizzy. She has had some headache, neck pain, and 

left shoulder pain from her falls. She uses a cane to get around. The patient 

has a history of brain surgery for an aneurysm which did not rupture in 2007.





   Source of History:  patient


   Onset:  3-4 days ago


   Position:  other (generalized)


   Quality:  other (frequent falls)


   Timing:  worsening


   Associated Symptoms:  + headache, + neck pain


Note:


Pt reports dizziness, left shoulder pain.





Review of Systems


See HPI for pertinent positives & negatives. A total of 10 systems reviewed and 

were otherwise negative.





Past Medical & Surgical


Medical Problems:


(1) Cervical stenosis of spinal canal








Family History





Diabetes mellitus


Hypertension





Social History


Smoking Status:  Former Smoker


Marital Status:  


Housing Status:  lives with significant other





Current/Historical Medications


Scheduled


Baclofen (Lioresal), 20 MG PO BID


Diphenhydramine Hcl (Benadryl), 50 MG PO HS


Oxycodone Hcl (Oxycontin), 15 MG PO Q12


Topiramate (Topamax), 50 MG PO BID


Trazodone Hcl (Trazodone), 150 MG PO HS


Venlafaxine Hcl (Effexor Extended Rel), 150 MG PO QPM


Verapamil (Calan), 120 MG PO HS





Scheduled PRN


Acetaminophen/Codeine (Tylenol W/Codeine #3), 1 TAB PO Q6H PRN for Pain


Gabapentin (Neurontin), 800 MG PO TID PRN for Pain





Allergies


Coded Allergies:  


     Ciprofloxacin (Verified  Allergy, Unknown, ITCH AND RASH, 4/10/18)


     NSAIDs (Verified  Adverse Reaction, Intermediate, N/V  AND DIARRHEA, 4/10/

18)


     Penicillins (Verified  Adverse Reaction, Mild, N/V, 4/10/18)


     Metformin (Verified  Adverse Reaction, Unknown, NAUSEA VOMITNG DIARRHEA, 4/

10/18)


     Varenicline (Verified  Adverse Reaction, Unknown, BAD NIGHTMARES, 4/10/18)





Physical Exam


Vital Signs











  Date Time  Temp Pulse Resp B/P (MAP) Pulse Ox O2 Delivery O2 Flow Rate FiO2


 


4/10/18 14:13  93 16 123/67 99   


 


4/10/18 13:18  78 16 128/81 98   


 


4/10/18 12:48  76 16 130/70 99 Room Air  


 


4/10/18 11:18 36.7 90 20 119/63 97 Room Air  











Physical Exam


VITAL SIGNS: were reviewed as above.


GENERAL:Non-toxic in appearance.


SKIN: Warm dry and pink.


HEAD: Normocephalic and atraumatic.


OROPHARYNX: Is clear and moist


NECK: Supple without lymphadenopathy or meningismus.


LUNGS: clear.


HEART: Regular rate and rhythm.


ABDOMEN: Soft and nontender.


EXTREMITIES: Warm and well perfused.


NEUROLOGICALLY: Awake alert and oriented without focal deficit. Cranial nerves 2

-12 are intact. There is no pronator drift. Cerebellar testing is within normal 

limits. There is no nystagmus. There is no facial droop. Speech is clear. 

Vision is grossly normal.


MUSCULOSKELETAL: Good muscle tone. No evidence of trauma.





Medical Decision & Procedures


ER Provider


Diagnostic Interpretation:


X ray results and stated below per my interpretation and radiology 

interpretation. Radiology results as stated below per my review and radiologist 

interpretation:





CHEST ONE VIEW PORTABLE





CLINICAL HISTORY: EVALUATE ALTERED MENTAL STATUS/WEAKNESS    





COMPARISON STUDY:  Chest radiograph January 12, 2011.





FINDINGS: Anterior spine fusion is noted. There is no pneumothorax or pleural


effusion. There is no consolidation. Linear left basilar opacity suggestive


atelectasis. There is pulmonary vascular congestion without overt pulmonary


edema. 





IMPRESSION:  Pulmonary vascular congestion. 





Electronically signed by:  Rubens Madison M.D.


4/10/2018 12:50 PM





Dictated Date/Time:  4/10/2018 12:47 PM








HEAD CT NONCONTRAST





CT DOSE: 669.45 mGycm





HISTORY: Frequent falls.  EVALUATE ALTERED MENTAL STATUS/WEAKNESS





TECHNIQUE: Multiaxial CT images of the head were performed without the use of


intravenous contrast. Automated exposure control was utilized for this study.  A


dose lowering technique was utilized adhering to the principles of ALARA.





Comparison: None.





Findings: The paranasal sinuses and mastoid air cells are clear. No acute


calvarial fractures. Postoperative changes consistent with a left temporal


craniectomy with overlying metallic plate. There is no aneurysm clip at the


basilar cisterns. Small focus of encephalomalacia within the left frontal


periventricular white matter consistent with an old infarct. There is no mass,


hematoma, midline shift, acute infarct.





Impression:


Postoperative changes as described above. No acute intracranial abnormality. 





Electronically signed by:  Kvng Christina M.D.


4/10/2018 1:18 PM





Dictated Date/Time:  4/10/2018 1:10 PM





Laboratory Results


4/10/18 12:45








Red Blood Count 4.10, Mean Corpuscular Volume 89.5, Mean Corpuscular Hemoglobin 

28.5, Mean Corpuscular Hemoglobin Concent 31.9, Mean Platelet Volume 9.4, 

Neutrophils (%) (Auto) 53.1, Lymphocytes (%) (Auto) 35.8, Monocytes (%) (Auto) 

8.6, Eosinophils (%) (Auto) 1.8, Basophils (%) (Auto) 0.5, Neutrophils # (Auto) 

3.28, Lymphocytes # (Auto) 2.21, Monocytes # (Auto) 0.53, Eosinophils # (Auto) 

0.11, Basophils # (Auto) 0.03





4/10/18 12:45

















Test


  4/10/18


12:45


 


White Blood Count


  6.17 K/uL


(4.8-10.8)


 


Red Blood Count


  4.10 M/uL


(4.2-5.4)


 


Hemoglobin


  11.7 g/dL


(12.0-16.0)


 


Hematocrit 36.7 % (37-47) 


 


Mean Corpuscular Volume


  89.5 fL


()


 


Mean Corpuscular Hemoglobin


  28.5 pg


(25-34)


 


Mean Corpuscular Hemoglobin


Concent 31.9 g/dl


(32-36)


 


Platelet Count


  256 K/uL


(130-400)


 


Mean Platelet Volume


  9.4 fL


(7.4-10.4)


 


Neutrophils (%) (Auto) 53.1 % 


 


Lymphocytes (%) (Auto) 35.8 % 


 


Monocytes (%) (Auto) 8.6 % 


 


Eosinophils (%) (Auto) 1.8 % 


 


Basophils (%) (Auto) 0.5 % 


 


Neutrophils # (Auto)


  3.28 K/uL


(1.4-6.5)


 


Lymphocytes # (Auto)


  2.21 K/uL


(1.2-3.4)


 


Monocytes # (Auto)


  0.53 K/uL


(0.11-0.59)


 


Eosinophils # (Auto)


  0.11 K/uL


(0-0.5)


 


Basophils # (Auto)


  0.03 K/uL


(0-0.2)


 


RDW Standard Deviation


  43.4 fL


(36.4-46.3)


 


RDW Coefficient of Variation


  13.3 %


(11.5-14.5)


 


Immature Granulocyte % (Auto) 0.2 % 


 


Immature Granulocyte # (Auto)


  0.01 K/uL


(0.00-0.02)


 


Anion Gap


  4.0 mmol/L


(3-11)


 


Estimated GFR (


American) 89.4 


 


 


Estimated GFR (Non-


American 77.2 


 


 


BUN/Creatinine Ratio 10.5 (10-20) 


 


Calcium Level


  8.6 mg/dl


(8.5-10.1)


 


Total Bilirubin


  0.3 mg/dl


(0.2-1)


 


Direct Bilirubin


  < 0.1 mg/dl


(0-0.2)


 


Aspartate Amino Transf


(AST/SGOT) 20 U/L (15-37) 


 


 


Alanine Aminotransferase


(ALT/SGPT) 18 U/L (12-78) 


 


 


Alkaline Phosphatase


  119 U/L


()


 


Troponin I


  < 0.015 ng/ml


(0-0.045)


 


Total Protein


  7.5 gm/dl


(6.4-8.2)


 


Albumin


  3.6 gm/dl


(3.4-5.0)





Laboratory results as stated above per my review.





ECG Per My Interpretation


Indication:  other (fall)


Rate (beats per minute):  62


Rhythm:  normal sinus


Findings:  no ectopy, other (no ST elevation)





ED Course


1223: Previous medical records were reviewed. The patient was evaluated in room 

A10. A complete history and physical examination was performed.





1401: On reevaluation, the patient is resting comfortably. I discussed the 

results and findings with the patient. She verbalized agreement of the 

treatment plan. She was discharged home.





Medical Decision


Differentials include: Acute coronary syndrome, myocardial infarction, CVA, TIA

, anemia, infection, pneumonia, UTI, pyelonephritis, poor nutrition, dehydration

, electrolyte disturbance, and hypoglycemia.





This is a 57-year-old female who presents to the ED with a chief complaint of 

frequent falls.  The patient does report a history of falling.  She states that 

she had an aneurysm in 2007 that was repaired.  The patient most recently has 

been falling more over the past few days.


She denies any significant trauma or injury related to her falls.  She does 

currently use a cane but does not use a walker.  She has not had recent 

illness.  Vital signs are normal.  Physical exam and neurologic exam was 

normal.  No focal deficits.  Nose abnormal cerebellar testing.  Chest x-ray 

revealed some pulmonary vascular congestion that is not evident on physical 

exam.  CT scan of the brain did not show acute process.  CBC and complete 

metabolic panel as well as troponin were normal.  The patient was told the 

results of the test.  I did recommend that she use a walker.  She was felt to 

be stable for discharge.





Medication Reconcilliation


Current Medication List:  was personally reviewed by me





Blood Pressure Screening


Patient's blood pressure:  Normal blood pressure


Blood pressure disposition:  Did not require urgent referral





Impression





 Primary Impression:  


 Fall





Scribe Attestation


The scribe's documentation has been prepared under my direction and personally 

reviewed by me in its entirety. I confirm that the note above accurately 

reflects all work, treatment, procedures, and medical decision making performed 

by me.





Departure Information


Dispostion


Home / Self-Care





Referrals


Keisha Cameron M.D. (PCP)





Patient Instructions


My [facility]





Additional Instructions





Follow-up with your doctor for further care and evaluation in 1-2 days.  Return 

to the emergency department for worsening or new symptoms or any concerns.


You have been examined and treated today on an emergency basis only. This is 

not a substitute for, or an effort to provide, complete comprehensive medical 

care. It is impossible to recognize and treat all injuries or illnesses in a 

single emergency department visit. It is therefore important that you follow up 

closely with your doctor.  Call as soon as possible for an appointment. Orthopedic Joint Progress Note    March 3, 2020  Admit Date: 3/2/2020  Admit Diagnosis: Osteoarthritis of left hip, unspecified osteoarthritis type [M16.12]  Osteoarthritis of left hip [M16.12]    1 Day Post-Op    Subjective:     Bryanna Mckeon alert, oriented with mild to moderate pain. Review of Systems: Pertinent items are noted in HPI. Objective:     PT/OT:     PATIENT MOBILITY    Bed Mobility  Rolling: Contact guard assistance  Supine to Sit: Minimum assistance  Sit to Supine: Minimum assistance  Scooting: Minimum assistance  Transfers  Sit to Stand: Minimum assistance, Assist x1, Assist x2  Stand to Sit: Minimum assistance  Bed to Chair: Contact guard assistance, Minimum assistance      Gait  Base of Support: Center of gravity altered, Shift to right  Speed/Lillie: Shuffled, Slow  Step Length: Right shortened, Left shortened  Stance: Left decreased, Time, Weight shift  Gait Abnormalities: Decreased step clearance, Antalgic, Path deviations, Trunk sway increased, Shuffling gait  Ambulation - Level of Assistance: Contact guard assistance  Distance (ft): 100 Feet (ft)  Assistive Device: Walker, rolling, Gait belt   Weight Bearing Status  Left Side Weight Bearing: As tolerated        Vital Signs:    Blood pressure 114/77, pulse 66, temperature 98 °F (36.7 °C), resp. rate 18, height 6' (1.829 m), weight 110.5 kg (243 lb 9.6 oz), SpO2 94 %.   Temp (24hrs), Av °F (36.7 °C), Min:97.4 °F (36.3 °C), Max:98.6 °F (37 °C)      Pain Control:   Pain Assessment  Pain Scale 1: Numeric (0 - 10)  Pain Intensity 1: 6  Pain Onset 1: post op  Pain Location 1: Hip  Pain Orientation 1: Left  Pain Description 1: Aching  Pain Intervention(s) 1: Medication (see MAR), Repositioned, Position, Relaxation technique    Meds:  Current Facility-Administered Medications   Medication Dose Route Frequency    nebivolol (BYSTOLIC) tablet 5 mg  5 mg Oral DAILY    rivaroxaban (XARELTO) tablet 20 mg  20 mg Oral DAILY WITH BREAKFAST    vortioxetine (TRINTELLIX) tablet 10 mg (Patient Supplied)  10 mg Oral DAILY    alcohol 62% (NOZIN) nasal  1 Ampule  1 Ampule Topical Q12H    0.9% sodium chloride infusion  100 mL/hr IntraVENous CONTINUOUS    sodium chloride (NS) flush 5-40 mL  5-40 mL IntraVENous Q8H    sodium chloride (NS) flush 5-40 mL  5-40 mL IntraVENous PRN    celecoxib (CELEBREX) capsule 200 mg  200 mg Oral Q12H    naloxone (NARCAN) injection 0.2-0.4 mg  0.2-0.4 mg IntraVENous Q10MIN PRN    dexamethasone (DECADRON) injection 10 mg  10 mg IntraVENous ONCE    diphenhydrAMINE (BENADRYL) capsule 25 mg  25 mg Oral Q4H PRN    senna-docusate (PERICOLACE) 8.6-50 mg per tablet 2 Tab  2 Tab Oral DAILY    oxyCODONE-acetaminophen (PERCOCET 10)  mg per tablet 1 Tab  1 Tab Oral Q4H PRN    HYDROmorphone (PF) (DILAUDID) injection 1 mg  1 mg IntraVENous Q3H PRN        LAB:    Lab Results   Component Value Date/Time    INR 2.8 (H) 08/30/2017 03:28 AM    INR 2.9 (H) 08/28/2017 08:07 AM    INR 2.5 (H) 08/27/2017 08:10 AM    INR (POC) 1.0 03/06/2018 06:10 AM     Lab Results   Component Value Date/Time    HGB 12.5 (L) 03/03/2020 04:29 AM    HGB 15.7 02/26/2020 02:34 PM    HGB 13.9 08/25/2017 09:21 AM       Wound Hip Left (Active)   Dressing Status Clean, dry, and intact 3/3/2020  7:54 AM   Dressing Type Aquacel 3/3/2020  7:54 AM   Number of days: 1       [REMOVED] Wound Back Posterior (Removed)   Number of days:        [REMOVED] Wound Shoulder Left (Removed)   Number of days: 728         Physical Exam:  No significant changes, NVI. No drainage.     Assessment:      Principal Problem:    Osteoarthritis of left hip (3/1/2020)         Plan:     Continue PT/OT/Rehab  Consult: Rehab team including PT, OT, recreational therapy, and     [facility]